# Patient Record
(demographics unavailable — no encounter records)

---

## 2025-03-16 NOTE — ASSESSMENT
[FreeTextEntry1] : Left distal radius fracture - will continue to manage with closed management  Reviewed radiographs with patient and discussed pathoanatomy. Discussed alignment is within acceptable parameters to manage with closed management in brace. Discussed risk of stiffness, late tendon injury, and displacement requiring operative intervention. WBAT, OT.  s/p left endoscopic CTR [DOS 6/1/23] - progressing well postop. OT for ROM. +Pillar pain.  F/u 12 weeks (increasing CTS symptoms on right side).    EXAM Right hand with no swelling or erythema. Able to make fist, oppose thumb to small finger with good thenar bulk. No intrinsic atrophy. Tender ring finger, near full motion. Sensation intact throughout with <2sec cap refill.  Right ring finger radiographs with no fracture nor dislocation. DIP arthrosis. 3-view   ASSESSMENT & PLAN Right ring finger sprain- Discussed with patient the radiographs and pathoanatomy. Management to consist of NSAODs prn, motion, ease into use, OT.  F/u 3mo

## 2025-03-16 NOTE — IMAGING
[de-identified] : Left wrist with resolved swelling, skin intact, no ecchymosis. -ttp at radial metaphysis, -ttp at radial styloid, Listers, or ulnar styloid. -ttp at thumb MP, stable. Able to make fist, oppose thumb to small finger and abduct fingers. Sensation intact throughout except decreased (improving) at radial and ulnar middle finger and radial ring finger. Incision well healed, no erythema nor drainage. <2sec cap refill. +pillar pain\par  \par  Left wrist radiographs from previous visit with distal radius fracture, nondisplaced. Maintained alignment. Healed.

## 2025-03-16 NOTE — HISTORY OF PRESENT ILLNESS
[de-identified] : 50F, LHD, [place] at [employer], fell backwards and braced her fall with her left wrist. Visited [facility] and confirmed left wrist was broken. This incident happened last night. Pain is tingling, numbness in the thumb. [facility] has braced her wrist. Has been icing it. Swelling is present.   5/27/22: f/u left wrist and left shoulder pain. Reports pain is worse now then when injury first occurred. Admits to having a lot of swelling over the last few days - has taken the brace off to reduce swelling, Did ice compress to reduce swelling. Robert ng pain is along the 5th metacarpal . Reports on Tuesday, almost fell over a dog bed and stopped herself with her left hand out of habbit. Also, hit her hand against the dashboard yesterday and felt a lot of pain.   6/21/22: f/u left wrist and left shoulder. Reports intermittent throbbing on the wrist. Reports starting to get ROM of fingers again. Denies numbness/tingling. Reports shoulder is much better - no complaints. No constitutional symptoms.   7/26/22: f/u left wrist. Reports having some intermittent pain still on along side her ulna and radius. Has good ROM of fingers. Denies numbness/tingling. No constitutional symptoms.   10/12/22: f/u left wrist. Reports having a decent amount of soreness and numbness/tingling in the middle finger. Admits to having swelling approx 1 week ago and saw something "twitching" in the palm of her hand. Reports pain over night is terrible and numbness/tingling toward the end of the day progressively getting worse.   10/25/22: f/u left wrist. Reports still having numbness/tingling. Here for EMG results.  2/27/23: f/u left wrist. Still haivng numbness/tingling. Was finally able to be "cleared" for surgery - here to discuss next steps.   6/13/23: s/p left endoscopic CTR [DOS 6/1/23] - Reports doing better since surgery. Denies numbness/ tingling. Sensation has returned, no nighttime symptoms. No constitutional symptoms.  7/25/23:  s/p left endoscopic CTR [DOS 6/1/23] - Reports having constant pain throughout her palm to thumb area, worst with grasping. Denies numbness/ tingling.   10/24/23: left wrist f/u, reports having minimal pain. Admits to attending 3 OT sessions with no relief. Admits to occasional numbness.  Slowly improving pillar pain.  ** NEW COMPLAINT ** 3/14/25:  Patient presents with right ring finger pain following an incident in January 2025 when her finger became caught between two shelves while retrieving an item, resulting in a popping sensation. Patient did not seek further treatment at the time, continues to experience occasional swelling and aching pain. Denies numbness/ tingling.

## 2025-03-16 NOTE — HISTORY OF PRESENT ILLNESS
[de-identified] : 50F, LHD, [place]ol[place] at [employer], fell backwards and braced her fall with her left wrist. Visited [facility] and confirmed left wrist was broken. This incident happened last night. Pain is tingling, numbness in the thumb. [facility] has braced her wrist. Has been icing it. Swelling is present.   5/27/22: f/u left wrist and left shoulder pain. Reports pain is worse now then when injury first occurred. Admits to having a lot of swelling over the last few days - has taken the brace off to reduce swelling, Did ice compress to reduce swelling. Robert ng pain is along the 5th metacarpal . Reports on Tuesday, almost fell over a dog bed and stopped herself with her left hand out of habbit. Also, hit her hand against the dashboard yesterday and felt a lot of pain.   6/21/22: f/u left wrist and left shoulder. Reports intermittent throbbing on the wrist. Reports starting to get ROM of fingers again. Denies numbness/tingling. Reports shoulder is much better - no complaints. No constitutional symptoms.   7/26/22: f/u left wrist. Reports having some intermittent pain still on along side her ulna and radius. Has good ROM of fingers. Denies numbness/tingling. No constitutional symptoms.   10/12/22: f/u left wrist. Reports having a decent amount of soreness and numbness/tingling in the middle finger. Admits to having swelling approx 1 week ago and saw something "twitching" in the palm of her hand. Reports pain over night is terrible and numbness/tingling toward the end of the day progressively getting worse.   10/25/22: f/u left wrist. Reports still having numbness/tingling. Here for EMG results.  2/27/23: f/u left wrist. Still haivng numbness/tingling. Was finally able to be "cleared" for surgery - here to discuss next steps.   6/13/23: s/p left endoscopic CTR [DOS 6/1/23] - Reports doing better since surgery. Denies numbness/ tingling. Sensation has returned, no nighttime symptoms. No constitutional symptoms.  7/25/23:  s/p left endoscopic CTR [DOS 6/1/23] - Reports having constant pain throughout her palm to thumb area, worst with grasping. Denies numbness/ tingling.   10/24/23: left wrist f/u, reports having minimal pain. Admits to attending 3 OT sessions with no relief. Admits to occasional numbness.  Slowly improving pillar pain.  ** NEW COMPLAINT ** 3/14/25:  Patient presents with right ring finger pain following an incident in January 2025 when her finger became caught between two shelves while retrieving an item, resulting in a popping sensation. Patient did not seek further treatment at the time, continues to experience occasional swelling and aching pain. Denies numbness/ tingling.

## 2025-03-16 NOTE — IMAGING
[de-identified] : Left wrist with resolved swelling, skin intact, no ecchymosis. -ttp at radial metaphysis, -ttp at radial styloid, Listers, or ulnar styloid. -ttp at thumb MP, stable. Able to make fist, oppose thumb to small finger and abduct fingers. Sensation intact throughout except decreased (improving) at radial and ulnar middle finger and radial ring finger. Incision well healed, no erythema nor drainage. <2sec cap refill. +pillar pain\par  \par  Left wrist radiographs from previous visit with distal radius fracture, nondisplaced. Maintained alignment. Healed.

## 2025-03-16 NOTE — IMAGING
[de-identified] : Left wrist with resolved swelling, skin intact, no ecchymosis. -ttp at radial metaphysis, -ttp at radial styloid, Listers, or ulnar styloid. -ttp at thumb MP, stable. Able to make fist, oppose thumb to small finger and abduct fingers. Sensation intact throughout except decreased (improving) at radial and ulnar middle finger and radial ring finger. Incision well healed, no erythema nor drainage. <2sec cap refill. +pillar pain\par  \par  Left wrist radiographs from previous visit with distal radius fracture, nondisplaced. Maintained alignment. Healed.